# Patient Record
Sex: MALE | Race: WHITE | NOT HISPANIC OR LATINO | Employment: FULL TIME | URBAN - METROPOLITAN AREA
[De-identification: names, ages, dates, MRNs, and addresses within clinical notes are randomized per-mention and may not be internally consistent; named-entity substitution may affect disease eponyms.]

---

## 2018-04-05 RX ORDER — METFORMIN HYDROCHLORIDE 500 MG/1
500 TABLET ORAL 2 TIMES DAILY WITH MEALS
Qty: 180 TABLET | Refills: 3 | Status: SHIPPED | OUTPATIENT
Start: 2018-04-05 | End: 2018-08-08

## 2018-04-05 RX ORDER — TESTOSTERONE CYPIONATE 200 MG/ML
200 INJECTION, SOLUTION INTRAMUSCULAR WEEKLY
Qty: 2 ML | Refills: 0 | Status: SHIPPED | OUTPATIENT
Start: 2018-04-05 | End: 2018-05-02 | Stop reason: SDUPTHER

## 2018-04-05 RX ORDER — METFORMIN HYDROCHLORIDE 500 MG/1
TABLET ORAL
Refills: 0 | COMMUNITY
Start: 2018-01-07 | End: 2018-08-08 | Stop reason: SDUPTHER

## 2018-04-05 NOTE — TELEPHONE ENCOUNTER
----- Message from Leah Deng sent at 4/5/2018 10:19 AM CDT -----  Pt states he is out of metformin and testosterone / asking for refills till he is able to see the   / patsy in [place] / call pt at [phone] to advise

## 2018-05-02 ENCOUNTER — OFFICE VISIT (OUTPATIENT)
Dept: FAMILY MEDICINE | Facility: CLINIC | Age: 33
End: 2018-05-02
Payer: COMMERCIAL

## 2018-05-02 VITALS
SYSTOLIC BLOOD PRESSURE: 141 MMHG | HEART RATE: 88 BPM | HEIGHT: 69 IN | BODY MASS INDEX: 37.18 KG/M2 | DIASTOLIC BLOOD PRESSURE: 78 MMHG | OXYGEN SATURATION: 96 % | RESPIRATION RATE: 20 BRPM | WEIGHT: 251 LBS | TEMPERATURE: 98 F

## 2018-05-02 DIAGNOSIS — E11.9 TYPE 2 DIABETES MELLITUS WITHOUT COMPLICATION, UNSPECIFIED WHETHER LONG TERM INSULIN USE: ICD-10-CM

## 2018-05-02 DIAGNOSIS — E29.1 DEFICIENCY OF TESTOSTERONE BIOSYNTHESIS: Primary | ICD-10-CM

## 2018-05-02 PROCEDURE — 99213 OFFICE O/P EST LOW 20 MIN: CPT | Mod: S$GLB,,, | Performed by: FAMILY MEDICINE

## 2018-05-02 PROCEDURE — 99999 PR PBB SHADOW E&M-EST. PATIENT-LVL III: CPT | Mod: PBBFAC,,, | Performed by: FAMILY MEDICINE

## 2018-05-02 RX ORDER — TESTOSTERONE CYPIONATE 200 MG/ML
200 INJECTION, SOLUTION INTRAMUSCULAR WEEKLY
Qty: 10 ML | Refills: 0 | Status: SHIPPED | OUTPATIENT
Start: 2018-05-02 | End: 2018-07-06 | Stop reason: SDUPTHER

## 2018-05-02 NOTE — PROGRESS NOTES
"Subjective:       Patient ID: Maye Islas is a 32 y.o. male.    Chief Complaint: Medication Refill    HPI   Mr. Islas presents for follow-up. He is due for bloodwork and medication refills. Has a history of type 2 diabetes and testosterone deficiency. Admits he has been out of testosterone for "a while." Reports he has noticed fatigue and depressed mood while off the testosterone.    Review of Systems   Constitutional: Negative for activity change, appetite change, chills and fever.   Respiratory: Negative for cough, choking, shortness of breath and wheezing.    Cardiovascular: Negative for chest pain, palpitations and leg swelling.   Skin: Negative for color change, pallor, rash and wound.       Past Medical History:   Diagnosis Date    Diabetes mellitus, type 2      Past Surgical History:   Procedure Laterality Date    TONSILLECTOMY       Social History     Social History    Marital status:      Spouse name: N/A    Number of children: N/A    Years of education: N/A     Occupational History     Becka     Social History Main Topics    Smoking status: Not on file    Smokeless tobacco: Current User     Types: Snuff    Alcohol use No    Drug use: No    Sexual activity: Yes     Partners: Female     Other Topics Concern    Not on file     Social History Narrative    No narrative on file     Family History   Problem Relation Age of Onset    Diabetes Mother     Diabetes Father     Diabetes Paternal Grandmother     Diabetes Paternal Grandfather        Objective:      BP (!) 141/78 (BP Location: Right arm)   Pulse 88   Temp 98 °F (36.7 °C)   Resp 20   Ht 5' 9" (1.753 m)   Wt 113.9 kg (251 lb)   SpO2 96%   BMI 37.07 kg/m²   Physical Exam   Constitutional: He is oriented to person, place, and time. He appears well-developed and well-nourished. No distress.   Eyes: Conjunctivae and EOM are normal. Pupils are equal, round, and reactive to light. No scleral icterus. " "  Cardiovascular: Normal rate, regular rhythm and normal heart sounds.    No murmur heard.  Neurological: He is alert and oriented to person, place, and time.   Skin: Skin is warm and dry. No rash noted. He is not diaphoretic.   Vitals reviewed.      Assessment:       1. Deficiency of testosterone biosynthesis    2. Type 2 diabetes mellitus without complication, unspecified whether long term insulin use        Plan:       Deficiency of testosterone biosynthesis  -     Patient to return in AM for fasting labs; see refill below; follow up in 4 months  -     Testosterone, Total, Males; Future; Expected date: 05/03/2018  -     PSA, Screening; Future; Expected date: 05/02/2018  -     syringe with needle (SYRINGE 3CC/21GX1") 3 mL 21 gauge x 1" Syrg; 1 Syringe by Misc.(Non-Drug; Combo Route) route once a week.  Dispense: 100 Syringe; Refill: 0  -     testosterone cypionate (DEPOTESTOTERONE CYPIONATE) 200 mg/mL injection; Inject 1 mL (200 mg total) into the muscle once a week.  Dispense: 10 mL; Refill: 0    Type 2 diabetes mellitus without complication, unspecified whether long term insulin use  -     Returning in AM for fasting labs; continue metformin bid  -     Comprehensive metabolic panel; Future; Expected date: 05/02/2018  -     Lipid panel; Future; Expected date: 05/02/2018  -     Hemoglobin A1c; Future; Expected date: 05/02/2018  -     CBC auto differential; Future; Expected date: 05/02/2018            Risks, benefits, and side effects were discussed with the patient. All questions were answered to the fullest satisfaction of the patient, and pt verbalized understanding and agreement to treatment plan. Pt was to call with any new or worsening symptoms, or present to the ER.    "

## 2018-05-03 ENCOUNTER — LAB VISIT (OUTPATIENT)
Dept: LAB | Facility: HOSPITAL | Age: 33
End: 2018-05-03
Attending: FAMILY MEDICINE
Payer: COMMERCIAL

## 2018-05-03 DIAGNOSIS — E11.9 TYPE 2 DIABETES MELLITUS WITHOUT COMPLICATION, UNSPECIFIED WHETHER LONG TERM INSULIN USE: ICD-10-CM

## 2018-05-03 DIAGNOSIS — E29.1 DEFICIENCY OF TESTOSTERONE BIOSYNTHESIS: ICD-10-CM

## 2018-05-03 LAB
ALBUMIN SERPL BCP-MCNC: 4.1 G/DL
ALP SERPL-CCNC: 48 U/L
ALT SERPL W/O P-5'-P-CCNC: 30 U/L
ANION GAP SERPL CALC-SCNC: 6 MMOL/L
AST SERPL-CCNC: 24 U/L
BASOPHILS # BLD AUTO: 0.09 K/UL
BASOPHILS NFR BLD: 1.3 %
BILIRUB SERPL-MCNC: 0.5 MG/DL
BUN SERPL-MCNC: 19 MG/DL
CALCIUM SERPL-MCNC: 9.2 MG/DL
CHLORIDE SERPL-SCNC: 106 MMOL/L
CHOLEST SERPL-MCNC: 158 MG/DL
CHOLEST/HDLC SERPL: 3.4 {RATIO}
CO2 SERPL-SCNC: 24 MMOL/L
COMPLEXED PSA SERPL-MCNC: 0.32 NG/ML
CREAT SERPL-MCNC: 0.7 MG/DL
DIFFERENTIAL METHOD: ABNORMAL
EOSINOPHIL # BLD AUTO: 0.2 K/UL
EOSINOPHIL NFR BLD: 3.2 %
ERYTHROCYTE [DISTWIDTH] IN BLOOD BY AUTOMATED COUNT: 11.9 %
EST. GFR  (AFRICAN AMERICAN): >60 ML/MIN/1.73 M^2
EST. GFR  (NON AFRICAN AMERICAN): >60 ML/MIN/1.73 M^2
ESTIMATED AVG GLUCOSE: 105 MG/DL
GLUCOSE SERPL-MCNC: 122 MG/DL
HBA1C MFR BLD HPLC: 5.3 %
HCT VFR BLD AUTO: 39.8 %
HDLC SERPL-MCNC: 46 MG/DL
HDLC SERPL: 29.1 %
HGB BLD-MCNC: 13.1 G/DL
IMM GRANULOCYTES # BLD AUTO: 0.01 K/UL
IMM GRANULOCYTES NFR BLD AUTO: 0.1 %
LDLC SERPL CALC-MCNC: 100.8 MG/DL
LYMPHOCYTES # BLD AUTO: 2.2 K/UL
LYMPHOCYTES NFR BLD: 32 %
MCH RBC QN AUTO: 27.2 PG
MCHC RBC AUTO-ENTMCNC: 32.9 G/DL
MCV RBC AUTO: 83 FL
MONOCYTES # BLD AUTO: 0.5 K/UL
MONOCYTES NFR BLD: 6.9 %
NEUTROPHILS # BLD AUTO: 3.8 K/UL
NEUTROPHILS NFR BLD: 56.5 %
NONHDLC SERPL-MCNC: 112 MG/DL
NRBC BLD-RTO: 0 /100 WBC
PLATELET # BLD AUTO: 258 K/UL
PMV BLD AUTO: 11.2 FL
POTASSIUM SERPL-SCNC: 4.1 MMOL/L
PROT SERPL-MCNC: 7.5 G/DL
RBC # BLD AUTO: 4.81 M/UL
SODIUM SERPL-SCNC: 136 MMOL/L
TRIGL SERPL-MCNC: 56 MG/DL
WBC # BLD AUTO: 6.78 K/UL

## 2018-05-03 PROCEDURE — 83036 HEMOGLOBIN GLYCOSYLATED A1C: CPT

## 2018-05-03 PROCEDURE — 84153 ASSAY OF PSA TOTAL: CPT

## 2018-05-03 PROCEDURE — 85025 COMPLETE CBC W/AUTO DIFF WBC: CPT

## 2018-05-03 PROCEDURE — 80053 COMPREHEN METABOLIC PANEL: CPT

## 2018-05-03 PROCEDURE — 80061 LIPID PANEL: CPT

## 2018-05-03 PROCEDURE — 36415 COLL VENOUS BLD VENIPUNCTURE: CPT

## 2018-05-09 DIAGNOSIS — D64.9 ANEMIA, UNSPECIFIED TYPE: Primary | ICD-10-CM

## 2018-07-06 ENCOUNTER — TELEPHONE (OUTPATIENT)
Dept: FAMILY MEDICINE | Facility: CLINIC | Age: 33
End: 2018-07-06

## 2018-07-06 DIAGNOSIS — E29.1 DEFICIENCY OF TESTOSTERONE BIOSYNTHESIS: ICD-10-CM

## 2018-07-06 RX ORDER — TESTOSTERONE CYPIONATE 200 MG/ML
200 INJECTION, SOLUTION INTRAMUSCULAR WEEKLY
Qty: 10 ML | Refills: 0 | Status: SHIPPED | OUTPATIENT
Start: 2018-07-06 | End: 2018-08-08 | Stop reason: SDUPTHER

## 2018-07-06 NOTE — TELEPHONE ENCOUNTER
----- Message from Annamaria Castellano sent at 7/6/2018  8:49 AM CDT -----  Contact: self  Type:  Patient Returning Call    Who Called:  patient  Who Left Message for Patient:  nurse  Does the patient know what this is regarding?:  no  Best Call Back Number:  [phone] (home)     Additional Information:

## 2018-07-06 NOTE — TELEPHONE ENCOUNTER
----- Message from Erica Angelbenita sent at 7/5/2018  4:15 PM CDT -----  Contact: Self  Type:  RX Refill Request    Who Called:  patient  Refill or New Rx:  refill  RX Name and Strength: testosterone cypionate (DEPOTESTOTERONE CYPIONATE) 200 mg/mL injection  How is the patient currently taking it? (ex. 1XDay):  na  Is this a 30 day or 90 day RX:  30 day  Preferred Pharmacy with phone number:    [facility] [address] [place] [address] AT [address]  [address] [address]  Phone: [phone] Fax: [phone]  Local or Mail Order:  local  Ordering Provider:  Dr Marcell Urbano Call Back Number:  [phone] (home)   Additional Information:  He has an appt coming up but needs script before then

## 2018-08-08 ENCOUNTER — LAB VISIT (OUTPATIENT)
Dept: LAB | Facility: HOSPITAL | Age: 33
End: 2018-08-08
Attending: FAMILY MEDICINE
Payer: COMMERCIAL

## 2018-08-08 ENCOUNTER — OFFICE VISIT (OUTPATIENT)
Dept: FAMILY MEDICINE | Facility: CLINIC | Age: 33
End: 2018-08-08
Payer: COMMERCIAL

## 2018-08-08 VITALS
OXYGEN SATURATION: 96 % | RESPIRATION RATE: 20 BRPM | HEIGHT: 70 IN | DIASTOLIC BLOOD PRESSURE: 84 MMHG | WEIGHT: 270 LBS | BODY MASS INDEX: 38.65 KG/M2 | SYSTOLIC BLOOD PRESSURE: 151 MMHG | HEART RATE: 65 BPM

## 2018-08-08 DIAGNOSIS — E29.1 DEFICIENCY OF TESTOSTERONE BIOSYNTHESIS: ICD-10-CM

## 2018-08-08 DIAGNOSIS — D64.9 ANEMIA, UNSPECIFIED TYPE: ICD-10-CM

## 2018-08-08 LAB
BASOPHILS # BLD AUTO: 0.13 K/UL
BASOPHILS NFR BLD: 1.6 %
DIFFERENTIAL METHOD: NORMAL
EOSINOPHIL # BLD AUTO: 0.2 K/UL
EOSINOPHIL NFR BLD: 2.7 %
ERYTHROCYTE [DISTWIDTH] IN BLOOD BY AUTOMATED COUNT: 11.8 %
FOLATE SERPL-MCNC: 6 NG/ML
HCT VFR BLD AUTO: 42.9 %
HGB BLD-MCNC: 14 G/DL
IMM GRANULOCYTES # BLD AUTO: 0.02 K/UL
IMM GRANULOCYTES NFR BLD AUTO: 0.2 %
IRON SERPL-MCNC: 48 UG/DL
LYMPHOCYTES # BLD AUTO: 2.3 K/UL
LYMPHOCYTES NFR BLD: 28.3 %
MCH RBC QN AUTO: 27.2 PG
MCHC RBC AUTO-ENTMCNC: 32.6 G/DL
MCV RBC AUTO: 84 FL
MONOCYTES # BLD AUTO: 0.6 K/UL
MONOCYTES NFR BLD: 7.7 %
NEUTROPHILS # BLD AUTO: 4.9 K/UL
NEUTROPHILS NFR BLD: 59.5 %
NRBC BLD-RTO: 0 /100 WBC
PLATELET # BLD AUTO: 252 K/UL
PMV BLD AUTO: 10.9 FL
RBC # BLD AUTO: 5.14 M/UL
SATURATED IRON: 15 %
TOTAL IRON BINDING CAPACITY: 330 UG/DL
TRANSFERRIN SERPL-MCNC: 223 MG/DL
VIT B12 SERPL-MCNC: 676 PG/ML
WBC # BLD AUTO: 8.16 K/UL

## 2018-08-08 PROCEDURE — 85025 COMPLETE CBC W/AUTO DIFF WBC: CPT

## 2018-08-08 PROCEDURE — 99213 OFFICE O/P EST LOW 20 MIN: CPT | Mod: S$GLB,ICN,, | Performed by: FAMILY MEDICINE

## 2018-08-08 PROCEDURE — 82607 VITAMIN B-12: CPT

## 2018-08-08 PROCEDURE — 83540 ASSAY OF IRON: CPT

## 2018-08-08 PROCEDURE — 99999 PR PBB SHADOW E&M-EST. PATIENT-LVL III: CPT | Mod: PBBFAC,,, | Performed by: FAMILY MEDICINE

## 2018-08-08 PROCEDURE — 36415 COLL VENOUS BLD VENIPUNCTURE: CPT

## 2018-08-08 PROCEDURE — 82746 ASSAY OF FOLIC ACID SERUM: CPT

## 2018-08-08 PROCEDURE — 3008F BODY MASS INDEX DOCD: CPT | Mod: S$GLB,ICN,, | Performed by: FAMILY MEDICINE

## 2018-08-08 RX ORDER — TESTOSTERONE CYPIONATE 200 MG/ML
200 INJECTION, SOLUTION INTRAMUSCULAR WEEKLY
Qty: 10 ML | Refills: 0 | Status: SHIPPED | OUTPATIENT
Start: 2018-08-08 | End: 2018-10-24 | Stop reason: SDUPTHER

## 2018-08-08 NOTE — PROGRESS NOTES
"Subjective:       Patient ID: Maye Islas is a 32 y.o. male.    Chief Complaint: Follow-up    Testosterone working well, requests refill today, no issues, compliant with therapy.      Review of Systems   Constitutional: Negative for activity change, appetite change, fatigue and fever.   HENT: Negative for congestion, dental problem, ear discharge, hearing loss, postnasal drip, sinus pain, sneezing and trouble swallowing.    Eyes: Negative for photophobia and discharge.   Respiratory: Negative for apnea, cough and shortness of breath.    Cardiovascular: Negative for chest pain.   Gastrointestinal: Negative for abdominal distention, abdominal pain, blood in stool, diarrhea, nausea and vomiting.   Endocrine: Negative for cold intolerance.   Genitourinary: Negative for difficulty urinating, flank pain, frequency, hematuria and testicular pain.   Musculoskeletal: Negative for arthralgias and myalgias.   Skin: Negative for color change.   Allergic/Immunologic: Negative for environmental allergies, food allergies and immunocompromised state.   Neurological: Negative for dizziness, syncope, numbness and headaches.   Hematological: Negative for adenopathy. Does not bruise/bleed easily.   Psychiatric/Behavioral: Negative for agitation, confusion, decreased concentration, hallucinations, self-injury and sleep disturbance.   All other systems reviewed and are negative.        Reviewed family, medical, surgical, and social history.    Objective:      BP (!) 151/84 (BP Location: Left arm, Patient Position: Sitting, BP Method: Large (Automatic))   Pulse 65   Resp 20   Ht 5' 10" (1.778 m)   Wt 122.5 kg (270 lb)   SpO2 96%   BMI 38.74 kg/m²   Physical Exam   Constitutional: He is oriented to person, place, and time. He appears well-developed and well-nourished. No distress.   HENT:   Head: Normocephalic and atraumatic.   Nose: Nose normal.   Mouth/Throat: Oropharynx is clear and moist. No oropharyngeal exudate.   Eyes: " Conjunctivae and EOM are normal. Pupils are equal, round, and reactive to light.   Neck: Normal range of motion. No thyromegaly present.   Cardiovascular: Normal rate, regular rhythm, normal heart sounds and intact distal pulses.  Exam reveals no gallop and no friction rub.    No murmur heard.  Pulmonary/Chest: Effort normal and breath sounds normal. No respiratory distress. He has no wheezes. He has no rales.   Abdominal: Soft. He exhibits no distension. There is no tenderness. There is no guarding.   Musculoskeletal: Normal range of motion. He exhibits no edema, tenderness or deformity.   Neurological: He is alert and oriented to person, place, and time. He displays normal reflexes. No cranial nerve deficit or sensory deficit. He exhibits normal muscle tone. Coordination normal.   Skin: Skin is warm and dry. No rash noted. He is not diaphoretic. No erythema. No pallor.   Psychiatric: He has a normal mood and affect. His behavior is normal. Judgment and thought content normal.   Nursing note and vitals reviewed.      Assessment:       1. Deficiency of testosterone biosynthesis        Plan:       Deficiency of testosterone biosynthesis  -     testosterone cypionate (DEPOTESTOTERONE CYPIONATE) 200 mg/mL injection; Inject 1 mL (200 mg total) into the muscle once a week.  Dispense: 10 mL; Refill: 0  -     CBC auto differential; Future; Expected date: 08/08/2018            Risks, benefits, and side effects were discussed with the patient. All questions were answered to the fullest satisfaction of the patient, and pt verbalized understanding and agreement to treatment plan. Pt was to call with any new or worsening symptoms, or present to the ER.

## 2018-08-14 ENCOUNTER — TELEPHONE (OUTPATIENT)
Dept: FAMILY MEDICINE | Facility: CLINIC | Age: 33
End: 2018-08-14

## 2018-08-14 NOTE — TELEPHONE ENCOUNTER
Spoke with pt.  He stated that he forgot to do the FOBT and it was explained to him that Dr Booth would like for him to submit this. Pt verbalized understanding.  Geraldine

## 2018-08-14 NOTE — TELEPHONE ENCOUNTER
----- Message from Denny Booth MD sent at 8/13/2018  5:03 PM CDT -----  Would you see if he is going to provide the FOBT. If not, that's fine, and I will release his other test results.

## 2018-10-24 ENCOUNTER — TELEPHONE (OUTPATIENT)
Dept: FAMILY MEDICINE | Facility: CLINIC | Age: 33
End: 2018-10-24

## 2018-10-24 DIAGNOSIS — E29.1 DEFICIENCY OF TESTOSTERONE BIOSYNTHESIS: ICD-10-CM

## 2018-10-24 RX ORDER — TESTOSTERONE CYPIONATE 200 MG/ML
200 INJECTION, SOLUTION INTRAMUSCULAR WEEKLY
Qty: 10 ML | Refills: 0 | Status: SHIPPED | OUTPATIENT
Start: 2018-10-24 | End: 2018-12-10 | Stop reason: SDUPTHER

## 2018-10-24 NOTE — TELEPHONE ENCOUNTER
----- Message from Megan Castle sent at 10/24/2018  3:54 PM CDT -----  Contact: patient  Type:  Patient Returning Call    Who Called:  patient  Who Left Message for Patient:  vadim  Does the patient know what this is regarding?:  yes  Best Call Back Number:  [phone]  Additional Information:  Requesting a call back

## 2018-10-24 NOTE — TELEPHONE ENCOUNTER
----- Message from Nilo Hansen sent at 10/24/2018  3:34 PM CDT -----  Type: Needs Medical Advice    Who Called:  Patient  Best Call Back Number: [phone] (home)   Additional Information: Patient wants to talk to someone about Testosterone

## 2018-12-10 ENCOUNTER — OFFICE VISIT (OUTPATIENT)
Dept: FAMILY MEDICINE | Facility: CLINIC | Age: 33
End: 2018-12-10
Payer: COMMERCIAL

## 2018-12-10 VITALS
HEIGHT: 69 IN | WEIGHT: 262.5 LBS | HEART RATE: 74 BPM | SYSTOLIC BLOOD PRESSURE: 143 MMHG | RESPIRATION RATE: 20 BRPM | DIASTOLIC BLOOD PRESSURE: 89 MMHG | BODY MASS INDEX: 38.88 KG/M2 | OXYGEN SATURATION: 97 %

## 2018-12-10 DIAGNOSIS — E29.1 DEFICIENCY OF TESTOSTERONE BIOSYNTHESIS: ICD-10-CM

## 2018-12-10 DIAGNOSIS — R73.9 HYPERGLYCEMIA: Primary | ICD-10-CM

## 2018-12-10 DIAGNOSIS — E11.9 TYPE 2 DIABETES MELLITUS WITHOUT COMPLICATION, WITHOUT LONG-TERM CURRENT USE OF INSULIN: ICD-10-CM

## 2018-12-10 PROCEDURE — 3008F BODY MASS INDEX DOCD: CPT | Mod: S$GLB,,, | Performed by: FAMILY MEDICINE

## 2018-12-10 PROCEDURE — 3044F HG A1C LEVEL LT 7.0%: CPT | Mod: S$GLB,,, | Performed by: FAMILY MEDICINE

## 2018-12-10 PROCEDURE — 99214 OFFICE O/P EST MOD 30 MIN: CPT | Mod: S$GLB,,, | Performed by: FAMILY MEDICINE

## 2018-12-10 PROCEDURE — 99999 PR PBB SHADOW E&M-EST. PATIENT-LVL III: CPT | Mod: PBBFAC,,, | Performed by: FAMILY MEDICINE

## 2018-12-10 RX ORDER — TESTOSTERONE CYPIONATE 200 MG/ML
200 INJECTION, SOLUTION INTRAMUSCULAR WEEKLY
Qty: 10 ML | Refills: 0 | Status: SHIPPED | OUTPATIENT
Start: 2018-12-10 | End: 2019-01-14 | Stop reason: SDUPTHER

## 2018-12-10 NOTE — PROGRESS NOTES
"Subjective:       Patient ID: Maye Islas is a 33 y.o. male.    Chief Complaint: Follow-up and Medication Refill    Follow up    Testosterone replacement going well  No issues  Compliant with therapy      Review of Systems   Constitutional: Negative for activity change, appetite change, fatigue and fever.   HENT: Negative for congestion, dental problem, ear discharge, hearing loss, postnasal drip, sinus pain, sneezing and trouble swallowing.    Eyes: Negative for photophobia and discharge.   Respiratory: Negative for apnea, cough and shortness of breath.    Cardiovascular: Negative for chest pain.   Gastrointestinal: Negative for abdominal distention, abdominal pain, blood in stool, diarrhea, nausea and vomiting.   Endocrine: Negative for cold intolerance.   Genitourinary: Negative for difficulty urinating, flank pain, frequency, hematuria and testicular pain.   Musculoskeletal: Negative for arthralgias and myalgias.   Skin: Negative for color change.   Allergic/Immunologic: Negative for environmental allergies, food allergies and immunocompromised state.   Neurological: Negative for dizziness, syncope, numbness and headaches.   Hematological: Negative for adenopathy. Does not bruise/bleed easily.   Psychiatric/Behavioral: Negative for agitation, confusion, decreased concentration, hallucinations, self-injury and sleep disturbance.   All other systems reviewed and are negative.        Reviewed family, medical, surgical, and social history.    Objective:      BP (!) 143/89 (BP Location: Left arm, Patient Position: Sitting, BP Method: Large (Automatic))   Pulse 74   Resp 20   Ht 5' 9" (1.753 m)   Wt 119.1 kg (262 lb 8 oz)   SpO2 97%   BMI 38.76 kg/m²   Physical Exam   Constitutional: He is oriented to person, place, and time. He appears well-developed and well-nourished. No distress.   HENT:   Head: Normocephalic and atraumatic.   Nose: Nose normal.   Mouth/Throat: Oropharynx is clear and moist. No " oropharyngeal exudate.   Eyes: Conjunctivae and EOM are normal. Pupils are equal, round, and reactive to light.   Neck: Normal range of motion. No thyromegaly present.   Cardiovascular: Normal rate, regular rhythm, normal heart sounds and intact distal pulses. Exam reveals no gallop and no friction rub.   No murmur heard.  Pulmonary/Chest: Effort normal and breath sounds normal. No respiratory distress. He has no wheezes. He has no rales.   Abdominal: Soft. He exhibits no distension. There is no tenderness. There is no guarding.   Musculoskeletal: Normal range of motion. He exhibits no edema, tenderness or deformity.   Neurological: He is alert and oriented to person, place, and time. He displays normal reflexes. No cranial nerve deficit or sensory deficit. He exhibits normal muscle tone. Coordination normal.   Skin: Skin is warm and dry. No rash noted. He is not diaphoretic. No erythema. No pallor.   Psychiatric: He has a normal mood and affect. His behavior is normal. Judgment and thought content normal.   Nursing note and vitals reviewed.      Assessment:       1. Hyperglycemia    2. Deficiency of testosterone biosynthesis    3. Type 2 diabetes mellitus without complication, without long-term current use of insulin        Plan:       Hyperglycemia  -     testosterone cypionate (DEPOTESTOTERONE CYPIONATE) 200 mg/mL injection; Inject 1 mL (200 mg total) into the muscle once a week.  Dispense: 10 mL; Refill: 0  -     CBC auto differential; Future; Expected date: 12/10/2018  -     Comprehensive metabolic panel; Future; Expected date: 12/10/2018  -     Hemoglobin A1c; Future; Expected date: 12/10/2018  -     Lipid panel; Future; Expected date: 12/10/2018  -     TSH; Future; Expected date: 12/10/2018  -     Testosterone; Future; Expected date: 12/10/2018  -     T4, free; Future; Expected date: 12/10/2018    Deficiency of testosterone biosynthesis  -     testosterone cypionate (DEPOTESTOTERONE CYPIONATE) 200 mg/mL  "injection; Inject 1 mL (200 mg total) into the muscle once a week.  Dispense: 10 mL; Refill: 0  -     CBC auto differential; Future; Expected date: 12/10/2018  -     Comprehensive metabolic panel; Future; Expected date: 12/10/2018  -     Hemoglobin A1c; Future; Expected date: 12/10/2018  -     Lipid panel; Future; Expected date: 12/10/2018  -     TSH; Future; Expected date: 12/10/2018  -     Testosterone; Future; Expected date: 12/10/2018  -     T4, free; Future; Expected date: 12/10/2018  -     PSA, Screening; Future; Expected date: 12/10/2018    Type 2 diabetes mellitus without complication, without long-term current use of insulin    Other orders  -     Cancel: syringe with needle (SYRINGE 3CC/21GX1") 3 mL 21 gauge x 1" Syrg; 1 Syringe by Misc.(Non-Drug; Combo Route) route once a week.  Dispense: 100 Syringe; Refill: 0            Risks, benefits, and side effects were discussed with the patient. All questions were answered to the fullest satisfaction of the patient, and pt verbalized understanding and agreement to treatment plan. Pt was to call with any new or worsening symptoms, or present to the ER.  "

## 2019-01-14 DIAGNOSIS — E29.1 DEFICIENCY OF TESTOSTERONE BIOSYNTHESIS: ICD-10-CM

## 2019-01-14 DIAGNOSIS — R73.9 HYPERGLYCEMIA: ICD-10-CM

## 2019-01-14 RX ORDER — TESTOSTERONE CYPIONATE 200 MG/ML
200 INJECTION, SOLUTION INTRAMUSCULAR WEEKLY
Qty: 10 ML | Refills: 0 | Status: SHIPPED | OUTPATIENT
Start: 2019-01-14 | End: 2019-02-18 | Stop reason: SDUPTHER

## 2019-02-18 DIAGNOSIS — R73.9 HYPERGLYCEMIA: ICD-10-CM

## 2019-02-18 DIAGNOSIS — E29.1 DEFICIENCY OF TESTOSTERONE BIOSYNTHESIS: ICD-10-CM

## 2019-02-18 RX ORDER — TESTOSTERONE CYPIONATE 200 MG/ML
200 INJECTION, SOLUTION INTRAMUSCULAR WEEKLY
Qty: 10 ML | Refills: 0 | Status: SHIPPED | OUTPATIENT
Start: 2019-02-18 | End: 2019-10-27

## 2019-02-18 NOTE — TELEPHONE ENCOUNTER
----- Message from Megan Castle sent at 2/18/2019 10:08 AM CST -----  Contact: patient  Type:  RX Refill Request    Who Called:  patient  Refill or New Rx:  refill  RX Name and Strength:  testosterone cypionate (DEPOTESTOTERONE CYPIONATE) 200 mg/mL injection  How is the patient currently taking it? (ex. 1XDay):    Is this a 30 day or 90 day RX:    Preferred Pharmacy with phone number:  [facility] (pateint is no longer with [facility] pharmacy)  Local or Mail Order:  Local  Ordering Provider:  Dr.Adams Urbano Call Back Number:  [phone]  Additional Information:  Requesting a call back when script has been sent

## 2019-05-03 DIAGNOSIS — E11.9 TYPE 2 DIABETES MELLITUS WITHOUT COMPLICATION: ICD-10-CM

## 2019-08-02 ENCOUNTER — PATIENT OUTREACH (OUTPATIENT)
Dept: ADMINISTRATIVE | Facility: HOSPITAL | Age: 34
End: 2019-08-02

## 2019-08-02 ENCOUNTER — TELEPHONE (OUTPATIENT)
Dept: ADMINISTRATIVE | Facility: HOSPITAL | Age: 34
End: 2019-08-02

## 2019-08-02 DIAGNOSIS — E11.9 TYPE 2 DIABETES MELLITUS WITHOUT COMPLICATION, WITHOUT LONG-TERM CURRENT USE OF INSULIN: Primary | ICD-10-CM

## 2019-08-02 NOTE — LETTER
August 2, 2019    Maye Islas  [address] [place] [address]             [facility]  [address]  [facility] [address]  Phone: [phone] Dear Je[facility] is committed to your overall health and would like to ensure that you are up to date on your recommended test and/or procedures.   Denny Booth MD  has found that your chart shows you may be due for the following:    YEARLY DIABETIC EYE EXAM & FOOT EXAM  DIABETIC LABS    If you have had any of the above done at another facility, please let us know so that we may obtain copies from that facility.  If you have a copy of these records, please provide a copy for us to scan into your chart.  You are welcome to request that the report be faxed to us at  ([phone]).     Otherwise, please schedule these appointments at your earliest convenience by calling [phone] or going to [email or web address].    If you have an upcoming scheduled appointment for the item above, please disregard this letter.    Sincerely,  Your [facility]  MD Bernarda Saunders L.P.N. Clinical Care Coordinator  [address] [place] [address] [phone]

## 2019-10-27 ENCOUNTER — HOSPITAL ENCOUNTER (EMERGENCY)
Facility: HOSPITAL | Age: 34
Discharge: HOME | End: 2019-10-27
Attending: EMERGENCY MEDICINE
Payer: COMMERCIAL

## 2019-10-27 VITALS
BODY MASS INDEX: 36.73 KG/M2 | SYSTOLIC BLOOD PRESSURE: 157 MMHG | OXYGEN SATURATION: 99 % | HEART RATE: 94 BPM | HEIGHT: 69 IN | RESPIRATION RATE: 20 BRPM | DIASTOLIC BLOOD PRESSURE: 95 MMHG | WEIGHT: 248 LBS | TEMPERATURE: 99 F

## 2019-10-27 DIAGNOSIS — S61.212A LACERATION OF RIGHT MIDDLE FINGER WITHOUT FOREIGN BODY, NAIL DAMAGE STATUS UNSPECIFIED, INITIAL ENCOUNTER: Primary | ICD-10-CM

## 2019-10-27 PROCEDURE — 73130 X-RAY EXAM OF HAND: CPT | Mod: TC,FY,RT

## 2019-10-27 PROCEDURE — 73130 X-RAY EXAM OF HAND: CPT | Mod: 26,RT,, | Performed by: RADIOLOGY

## 2019-10-27 PROCEDURE — 73130 XR HAND COMPLETE 3 VIEW RIGHT: ICD-10-PCS | Mod: 26,RT,, | Performed by: RADIOLOGY

## 2019-10-27 PROCEDURE — 63600175 PHARM REV CODE 636 W HCPCS

## 2019-10-27 PROCEDURE — 25000003 PHARM REV CODE 250

## 2019-10-27 PROCEDURE — 63600175 PHARM REV CODE 636 W HCPCS: Performed by: INTERNAL MEDICINE

## 2019-10-27 PROCEDURE — 96372 THER/PROPH/DIAG INJ SC/IM: CPT | Mod: 59

## 2019-10-27 PROCEDURE — 99284 EMERGENCY DEPT VISIT MOD MDM: CPT | Mod: 25

## 2019-10-27 PROCEDURE — 90714 TD VACC NO PRESV 7 YRS+ IM: CPT

## 2019-10-27 PROCEDURE — 90471 IMMUNIZATION ADMIN: CPT

## 2019-10-27 PROCEDURE — 12002 RPR S/N/AX/GEN/TRNK2.6-7.5CM: CPT

## 2019-10-27 PROCEDURE — 25000003 PHARM REV CODE 250: Performed by: INTERNAL MEDICINE

## 2019-10-27 RX ORDER — CEFTRIAXONE 1 G/1
1 INJECTION, POWDER, FOR SOLUTION INTRAMUSCULAR; INTRAVENOUS
Status: COMPLETED | OUTPATIENT
Start: 2019-10-27 | End: 2019-10-27

## 2019-10-27 RX ORDER — LIDOCAINE HYDROCHLORIDE 10 MG/ML
INJECTION INFILTRATION; PERINEURAL
Status: COMPLETED
Start: 2019-10-27 | End: 2019-10-27

## 2019-10-27 RX ORDER — LIDOCAINE HYDROCHLORIDE 10 MG/ML
5 INJECTION, SOLUTION EPIDURAL; INFILTRATION; INTRACAUDAL; PERINEURAL
Status: DISCONTINUED | OUTPATIENT
Start: 2019-10-27 | End: 2019-10-27

## 2019-10-27 RX ORDER — DOXYCYCLINE 100 MG/1
100 CAPSULE ORAL 2 TIMES DAILY
Qty: 20 CAPSULE | Refills: 0 | Status: SHIPPED | OUTPATIENT
Start: 2019-10-27 | End: 2019-11-06

## 2019-10-27 RX ORDER — HYDROCODONE BITARTRATE AND ACETAMINOPHEN 10; 325 MG/1; MG/1
1 TABLET ORAL EVERY 4 HOURS PRN
Qty: 8 TABLET | Refills: 0 | Status: SHIPPED | OUTPATIENT
Start: 2019-10-27 | End: 2020-08-10

## 2019-10-27 RX ORDER — HYDROCODONE BITARTRATE AND ACETAMINOPHEN 10; 325 MG/1; MG/1
1 TABLET ORAL
Status: COMPLETED | OUTPATIENT
Start: 2019-10-27 | End: 2019-10-27

## 2019-10-27 RX ORDER — DOXYCYCLINE HYCLATE 100 MG
100 TABLET ORAL
Status: COMPLETED | OUTPATIENT
Start: 2019-10-27 | End: 2019-10-27

## 2019-10-27 RX ADMIN — TETANUS AND DIPHTHERIA TOXOIDS ADSORBED 0.5 ML: 2; 2 INJECTION INTRAMUSCULAR at 05:10

## 2019-10-27 RX ADMIN — CEFTRIAXONE SODIUM 1 G: 1 INJECTION, POWDER, FOR SOLUTION INTRAMUSCULAR; INTRAVENOUS at 06:10

## 2019-10-27 RX ADMIN — DOXYCYCLINE HYCLATE 100 MG: 100 TABLET, COATED ORAL at 06:10

## 2019-10-27 RX ADMIN — LIDOCAINE HYDROCHLORIDE 200 MG: 10 INJECTION, SOLUTION INFILTRATION; PERINEURAL at 05:10

## 2019-10-27 RX ADMIN — HYDROCODONE BITARTRATE AND ACETAMINOPHEN 1 TABLET: 10; 325 TABLET ORAL at 06:10

## 2019-10-27 NOTE — ED PROVIDER NOTES
Encounter Date: 10/27/2019       History     Chief Complaint   Patient presents with    Finger Injury     Patient cut the end of his right middle finger off, smashed in a leaf spring on a truck.     Patient comes in with the very tip of his finger avulsed cleanly with the tip of his finger in a bag.  The bone was not clearly apparent and the wound was clean both the proximal tip of the finger as well as on the avulsed tip.  This happened approximately 30-40 minutes prior to arrival.  He immediately put the tip on ice and came to the ED.  The avulsed piece was full-thickness.  The nail was partially avulsed the 3/4 of the nail was still intact. Was no profuse bleeding.  It was elected to immediately tack the avulsed tip on to the finger this was successfully performed with 3 0 nylon.  The sutures were left relatively lose but good approximation was obtained. X-ray is being obtained.    Patient was explained that this will only protect the finger and that there is 80% chance the tip will not survive.  He needs follow up immediately with a hand surgeon may need to do a flap closure, but that decision will need to be made within the next 24-48 hours.  Is perhaps a 20% chance this tip might take.  It was packed and a petrolatum dressing.        Review of patient's allergies indicates:  No Known Allergies  Past Medical History:   Diagnosis Date    Diabetes mellitus, type 2      Past Surgical History:   Procedure Laterality Date    TONSILLECTOMY       Family History   Problem Relation Age of Onset    Diabetes Mother     Diabetes Father     Diabetes Paternal Grandmother     Diabetes Paternal Grandfather      Social History     Tobacco Use    Smoking status: Former Smoker     Years: 10.00    Smokeless tobacco: Current User     Types: Snuff   Substance Use Topics    Alcohol use: No    Drug use: No     Review of Systems   Constitutional: Negative for fever.   HENT: Negative for sore throat.    Respiratory: Negative  for shortness of breath.    Cardiovascular: Negative for chest pain.   Gastrointestinal: Negative for nausea.   Genitourinary: Negative for dysuria.   Musculoskeletal: Negative for back pain.   Skin: Negative for rash.   Neurological: Negative for weakness.   Hematological: Does not bruise/bleed easily.       Physical Exam     Initial Vitals [10/27/19 1730]   BP Pulse Resp Temp SpO2   (!) 157/95 94 20 98.6 °F (37 °C) 99 %      MAP       --         Physical Exam    Nursing note and vitals reviewed.  Constitutional: Vital signs are normal. He appears well-developed and well-nourished. He is active and cooperative.   HENT:   Head: Normocephalic and atraumatic.   Eyes: Conjunctivae, EOM and lids are normal. Pupils are equal, round, and reactive to light. Lids are everted and swept, no foreign bodies found.   Neck: Trachea normal, normal range of motion and full passive range of motion without pain. Neck supple.   Cardiovascular: Normal rate, regular rhythm, S1 normal, S2 normal, normal heart sounds, intact distal pulses and normal pulses.  No extrasystoles are present.    Pulmonary/Chest: Breath sounds normal.   Abdominal: Soft. Normal appearance and bowel sounds are normal.   Musculoskeletal: Normal range of motion.        Arms:  Neurological: He is alert. He has normal reflexes. GCS eye subscore is 4. GCS verbal subscore is 5. GCS motor subscore is 6.   Skin: Skin is warm, dry and intact. Capillary refill takes less than 2 seconds.   Psychiatric: He has a normal mood and affect. His speech is normal and behavior is normal. Cognition and memory are normal.         ED Course   Lac Repair  Date/Time: 10/27/2019 6:18 PM  Performed by: Tashi Rocha MD  Authorized by: Tashi Rocha MD   Consent Done: Emergent Situation  Body area: upper extremity  Location details: right long finger  Laceration length: 5 cm  Foreign bodies: no foreign bodies  Tendon involvement: none  Nerve involvement: none  Vascular damage:  no  Anesthesia: digital block    Anesthesia:  Local anesthesia used: yes  Local Anesthetic: lidocaine 1% without epinephrine  Anesthetic total: 8 mL  Patient sedated: no  Preparation: Patient was prepped and draped in the usual sterile fashion.  Irrigation method: jet lavage  Amount of cleaning: standard  Debridement: none  Degree of undermining: none  Skin closure: 3-0 nylon  Number of sutures: 8  Technique: simple  Approximation: loose  Approximation difficulty: simple  Comments: Patient had the tip of the avulsed finger 1st attached at the nail beds with pre good approximation.  Then had a anchor suture in the middle of the nail placed and then remainder of the tip was sutured in place.  Fairly good approximation was obtained. No return of color was noted although not expected.        Labs Reviewed - No data to display       Imaging Results          X-Ray Hand 3 view Right (In process)                  Medical Decision Making:   ED Management:  Patient comes in with a tip of his middle right finger avulsed.  He placed it on ice and came directly to the hospital.  Approximately 30 min to onset.  The bone was not evident and therefore was elected to replace the pad immediately.  He had good approximation since the nail bed was not avulsed and landmarks were evident.  X-ray revealed no bony injury. He is to see a hand surgeon tomorrow which will be arranged.                      Clinical Impression:       ICD-10-CM ICD-9-CM   1. Laceration of right middle finger without foreign body, nail damage status unspecified, initial encounter S61.212A 883.0                                Tashi Rocha MD  10/27/19 1823

## 2019-11-18 ENCOUNTER — TELEPHONE (OUTPATIENT)
Dept: ADMINISTRATIVE | Facility: HOSPITAL | Age: 34
End: 2019-11-18

## 2020-01-07 LAB
LEFT EYE DM RETINOPATHY: NEGATIVE
RIGHT EYE DM RETINOPATHY: NEGATIVE

## 2020-02-21 ENCOUNTER — TELEPHONE (OUTPATIENT)
Dept: FAMILY MEDICINE | Facility: CLINIC | Age: 35
End: 2020-02-21

## 2020-02-21 NOTE — TELEPHONE ENCOUNTER
Pt has not been seen by PCP in over a year. I called pt to see if there is anything we can assist him with at this time. No answer.

## 2020-03-24 ENCOUNTER — PATIENT OUTREACH (OUTPATIENT)
Dept: ADMINISTRATIVE | Facility: HOSPITAL | Age: 35
End: 2020-03-24

## 2020-03-24 ENCOUNTER — TELEPHONE (OUTPATIENT)
Dept: ADMINISTRATIVE | Facility: HOSPITAL | Age: 35
End: 2020-03-24

## 2020-03-24 NOTE — PROGRESS NOTES
"Population Health Outreach.  Spoke with pt about overdue labs and needing an annual appt. Pt stated that he was not sick and was not coming in. I attempted to educate him about the importance of labs when he is a diabetic. He stated "I am not a Diabetic" he informed me he will call when he is ready to be seen.  "

## 2020-08-07 ENCOUNTER — HOSPITAL ENCOUNTER (EMERGENCY)
Facility: HOSPITAL | Age: 35
Discharge: HOME | End: 2020-08-07
Attending: EMERGENCY MEDICINE
Payer: COMMERCIAL

## 2020-08-07 VITALS
TEMPERATURE: 98 F | HEIGHT: 70 IN | BODY MASS INDEX: 35.22 KG/M2 | SYSTOLIC BLOOD PRESSURE: 134 MMHG | HEART RATE: 78 BPM | RESPIRATION RATE: 20 BRPM | OXYGEN SATURATION: 99 % | WEIGHT: 246 LBS | DIASTOLIC BLOOD PRESSURE: 93 MMHG

## 2020-08-07 DIAGNOSIS — R00.2 PALPITATIONS: ICD-10-CM

## 2020-08-07 LAB — POCT GLUCOSE: 101 MG/DL (ref 70–110)

## 2020-08-07 PROCEDURE — 93005 ELECTROCARDIOGRAM TRACING: CPT

## 2020-08-07 PROCEDURE — 82962 GLUCOSE BLOOD TEST: CPT

## 2020-08-07 PROCEDURE — 99283 EMERGENCY DEPT VISIT LOW MDM: CPT | Mod: 25

## 2020-08-07 NOTE — ED PROVIDER NOTES
Please note that my documentation in this Electronic Healthcare Record was produced using speech recognition software and therefore may contain errors related to that software.These could include grammar, punctuation and spelling errors or the inclusion/ exclusion of phrases that were not intended. Please contact myself for any clarification, questions or concerns.    HPI: Patient is a 34 y.o. male who presents with the chief complaint of palpitations.  Patient feels as if his heart has been skipping a beat for the last 5 days.  Has had similar symptoms but never had it evaluated.  States it happens every few minutes.  Denies any associated symptoms such as lightheadedness, dizziness, chest pain, shortness of breath, visual changes, extremity weakness or paresthesias.  He does drink 2 monster energy drinks every morning.  He did stop taking them a couple days ago but still having the palpitations.  He is now getting headaches from the caffeine withdrawals.  Previous history of diabetes but controlled with diet.  He does not take any medication regularly.  Does use tobacco but denies any drugs or alcohol.  Denies any allergies.    REVIEW OF SYSTEMS - 10 systems were independently reviewed and are otherwise negative with the exception of those items previously documented in the HPI and nursing notes.    Allergy: Patient has no known allergies.    Past medical history:   Past Medical History:   Diagnosis Date    Diabetes mellitus, type 2        Surgical History:   Past Surgical History:   Procedure Laterality Date    TONSILLECTOMY         Social history:   Social History     Socioeconomic History    Marital status:      Spouse name: Not on file    Number of children: Not on file    Years of education: Not on file    Highest education level: Not on file   Occupational History    Occupation:      Employer: [employer]   Social Needs    Financial resource strain: Not on file    Food  "insecurity     Worry: Not on file     Inability: Not on file    Transportation needs     Medical: Not on file     Non-medical: Not on file   Tobacco Use    Smoking status: Former Smoker     Years: 10.00    Smokeless tobacco: Current User     Types: Snuff   Substance and Sexual Activity    Alcohol use: No    Drug use: No    Sexual activity: Yes     Partners: Female   Lifestyle    Physical activity     Days per week: Not on file     Minutes per session: Not on file    Stress: Not on file   Relationships    Social connections     Talks on phone: Not on file     Gets together: Not on file     Attends Bahai service: Not on file     Active member of club or organization: Not on file     Attends meetings of clubs or organizations: Not on file     Relationship status: Not on file   Other Topics Concern    Not on file   Social History Narrative    Not on file       Family history: non-contributory    EHR: reviewed    Vitals: BP (!) 134/93   Pulse 78   Temp 98 °F (36.7 °C)   Resp 20   Ht 5' 10" (1.778 m)   Wt 111.6 kg (246 lb)   SpO2 99%   BMI 35.30 kg/m²     PHYSICAL EXAM:    General-34-year-old male awake and alert, oriented, GCS 15, in no acute distress,  HEENT- normocephalic, atraumatic, sclera anicteric, moist mucous membranes, PERRL, EOMI  CARDIOVASCULAR- regular rate and rhythm, no murmurs/rubs,/gallops, normal S1-S2  PULMONARY- nonlabored, no respiratory distress, clear to auscultation bilaterally, no wheezes/rhonchi/rales, chest expansion symmetrical  NEUROLOGIC- mental status normal, speech fluid, cognition normal, CN II-XII grossly intact, sensations equal normal bilateral upper and lower extremities, peripheral pulse 2 +/4, ambulatory with proper gait.  MUSCULOSKELETAL- well-nourished, well-developed  DERMATOLOGIC- warm and dry, no visible rashes  PSYCHIATRIC- normal affect, normal concentration           Labs Reviewed   POCT GLUCOSE       No orders to display    EKG 08/07/2020 10:53 a.m..  " Sinus rhythm 60.  Intervals normal.  Normal axis deviation.  No ST segment changes or T-wave inversions noted.  No obvious signs of arrhythmia, Blas-Parkinson-White, or MI    MEDICAL DECISION MAKING: Patient is a 34 y.o. male who presented with chief complaint of palpitations.  Not currently experiencing the symptom at this time.  Does not have any associated symptoms with it.  Does feel as if his heart is skipping a beat every so often.  No current medical problems.  Does not take any medication regularly.  He does use tobacco and also drinks 2 energy drinks per day.  He is trying to cut back and is now experiencing withdrawal headache.  Currently has no symptoms at this time.  On examination, cardiac and pulmonary exam was normal.  No extremity edema.  Denies any family history of ACS.  Came in because his wife was concerned and wanted him to be checked out.  EKG performed today is normal.  Advised patient that he could have multiple arrhythmias but we need to have a Holter monitor and see a primary care.  Low suspicion for SVT, WPW, MI, thyroid storm at this time.    CLINICAL IMPRESSION:  1. Palpitations         JEFF Rebolledo  08/07/20 [id number]

## 2020-08-07 NOTE — DISCHARGE INSTRUCTIONS
Keep a written log of the time and day when he developed the symptoms.  Please abstain from drinking any caffeinated products.  Return for any worsening or new symptoms. Follow up with Primary Care Provider in the next 2-3 days.

## 2020-08-10 ENCOUNTER — OFFICE VISIT (OUTPATIENT)
Dept: FAMILY MEDICINE | Facility: CLINIC | Age: 35
End: 2020-08-10
Payer: COMMERCIAL

## 2020-08-10 ENCOUNTER — HOSPITAL ENCOUNTER (OUTPATIENT)
Dept: CARDIOLOGY | Facility: HOSPITAL | Age: 35
Discharge: HOME | End: 2020-08-10
Attending: NURSE PRACTITIONER
Payer: COMMERCIAL

## 2020-08-10 VITALS
WEIGHT: 250 LBS | SYSTOLIC BLOOD PRESSURE: 132 MMHG | BODY MASS INDEX: 35.79 KG/M2 | DIASTOLIC BLOOD PRESSURE: 87 MMHG | TEMPERATURE: 97 F | HEART RATE: 62 BPM | OXYGEN SATURATION: 97 % | RESPIRATION RATE: 18 BRPM | HEIGHT: 70 IN

## 2020-08-10 DIAGNOSIS — Z13.220 ENCOUNTER FOR LIPID SCREENING FOR CARDIOVASCULAR DISEASE: ICD-10-CM

## 2020-08-10 DIAGNOSIS — R00.2 HEART PALPITATIONS: ICD-10-CM

## 2020-08-10 DIAGNOSIS — Z13.6 ENCOUNTER FOR LIPID SCREENING FOR CARDIOVASCULAR DISEASE: ICD-10-CM

## 2020-08-10 DIAGNOSIS — R00.2 HEART PALPITATIONS: Primary | ICD-10-CM

## 2020-08-10 DIAGNOSIS — Z13.1 SCREENING FOR DIABETES MELLITUS (DM): ICD-10-CM

## 2020-08-10 PROCEDURE — 99999 PR PBB SHADOW E&M-EST. PATIENT-LVL III: CPT | Mod: PBBFAC,,, | Performed by: NURSE PRACTITIONER

## 2020-08-10 PROCEDURE — 99999 PR PBB SHADOW E&M-EST. PATIENT-LVL III: ICD-10-PCS | Mod: PBBFAC,,, | Performed by: NURSE PRACTITIONER

## 2020-08-10 PROCEDURE — 99213 OFFICE O/P EST LOW 20 MIN: CPT | Mod: S$GLB,,, | Performed by: NURSE PRACTITIONER

## 2020-08-10 PROCEDURE — 0298T HOLTER MONITOR - 3-14 DAY ADULT (CUPID ONLY): ICD-10-PCS | Mod: ,,, | Performed by: INTERNAL MEDICINE

## 2020-08-10 PROCEDURE — 3008F PR BODY MASS INDEX (BMI) DOCUMENTED: ICD-10-PCS | Mod: S$GLB,,, | Performed by: NURSE PRACTITIONER

## 2020-08-10 PROCEDURE — 3008F BODY MASS INDEX DOCD: CPT | Mod: S$GLB,,, | Performed by: NURSE PRACTITIONER

## 2020-08-10 PROCEDURE — 0298T HOLTER MONITOR - 3-14 DAY ADULT (CUPID ONLY): CPT | Mod: ,,, | Performed by: INTERNAL MEDICINE

## 2020-08-10 PROCEDURE — 99213 PR OFFICE/OUTPT VISIT, EST, LEVL III, 20-29 MIN: ICD-10-PCS | Mod: S$GLB,,, | Performed by: NURSE PRACTITIONER

## 2020-08-10 NOTE — PROGRESS NOTES
"Subjective:       Patient ID: Maye Islas is a 34 y.o. male.    Chief Complaint: Irregular Heart Beat    Mr. Maye Islas is a 34 year old male who presents to the clinic today for evaluation of heart palpitations. Reports he went to the ER last week for eval. Reports they are becoming worse. Previously was drinking monsters, but has not in quite a few days. No medical hx of heart concerns. No family of heart concerns. Denies cp sob n/v/d. Reports he can feel when it happens and it is worrying him. Denies hx of HTN or PAC's. Currently not taking any medication OTC. Active.     Review of Systems   Constitutional: Negative for activity change, diaphoresis, fatigue and fever.   Respiratory: Negative for cough, chest tightness, shortness of breath and wheezing.    Cardiovascular: Positive for palpitations. Negative for chest pain and leg swelling.   Gastrointestinal: Negative for abdominal pain, diarrhea, nausea and vomiting.   Genitourinary: Negative for difficulty urinating.   Musculoskeletal: Negative for arthralgias and myalgias.   Neurological: Negative for dizziness, tremors, syncope and weakness.   Psychiatric/Behavioral: Negative for confusion and dysphoric mood.         Reviewed family, medical, surgical, and social history.    Objective:      /87 (BP Location: Left arm, Patient Position: Sitting, BP Method: Medium (Automatic))   Pulse 62   Temp 97.3 °F (36.3 °C) (Oral)   Resp 18   Ht 5' 10" (1.778 m)   Wt 113.4 kg (250 lb)   SpO2 97%   BMI 35.87 kg/m²   Physical Exam  Vitals signs reviewed.   Constitutional:       Appearance: Normal appearance.   HENT:      Head: Normocephalic.      Right Ear: Tympanic membrane normal.      Left Ear: Tympanic membrane normal.      Nose: Nose normal.   Cardiovascular:      Rate and Rhythm: Normal rate.      Pulses: Normal pulses.      Heart sounds: No murmur.      Comments: PAC's noted upon exam when having patient lie down on exam   Pulmonary:      Effort: " Pulmonary effort is normal.      Breath sounds: Normal breath sounds. No wheezing.   Abdominal:      General: Bowel sounds are normal. There is no distension.      Tenderness: There is no abdominal tenderness.   Skin:     General: Skin is warm.      Capillary Refill: Capillary refill takes less than 2 seconds.   Neurological:      General: No focal deficit present.      Mental Status: He is alert.   Psychiatric:         Mood and Affect: Mood normal.         Behavior: Behavior normal.         Thought Content: Thought content normal.         Judgment: Judgment normal.         Assessment:       1. Heart palpitations    2. Encounter for lipid screening for cardiovascular disease    3. Screening for diabetes mellitus (DM)        Plan:       Heart palpitations  -     CBC auto differential; Future; Expected date: 08/17/2020  -     Comprehensive metabolic panel; Future; Expected date: 08/10/2020  -     TSH; Future; Expected date: 08/10/2020  -     Holter Monitor - 3-14 Day Adult; Future    Encounter for lipid screening for cardiovascular disease  -     Lipid Panel; Future; Expected date: 08/10/2020    Screening for diabetes mellitus (DM)  -     Hemoglobin A1C; Future; Expected date: 08/10/2020        PLAN:  - Discussed with patient the plan of care   - Obtain labs and Holter monitor   - Medications reviewed. Medication side effects discussed. Patient has no questions or concerns at this time. Informed patient to notify me regarding any concerns.   - Continue monitoring , keep diary, avoid caffeine   - Informed patient to please notify me with any questions or concerns at anytime  - Follow up ordered for 2-4 weeks        Risks, benefits, and side effects were discussed with the patient. All questions were answered to the fullest satisfaction of the patient, and pt verbalized understanding and agreement to treatment plan. Pt was to call with any new or worsening symptoms, or present to the ER.

## 2020-08-17 ENCOUNTER — PATIENT OUTREACH (OUTPATIENT)
Dept: ADMINISTRATIVE | Facility: HOSPITAL | Age: 35
End: 2020-08-17

## 2020-08-17 NOTE — PROGRESS NOTES
Pre-visit Chart review notification  Spoke with pt about overdue screenings. Pt stated that his last eye exam was with Dr Perla. Encouraged pt to sign up with [facility] at upcoming appt.  08/17/20  FAX SENT TO DR PERLA FOR MOST RECENT DM EYE EXAM RESULTS

## 2020-08-19 ENCOUNTER — PATIENT OUTREACH (OUTPATIENT)
Dept: ADMINISTRATIVE | Facility: HOSPITAL | Age: 35
End: 2020-08-19

## 2020-09-09 ENCOUNTER — PATIENT OUTREACH (OUTPATIENT)
Dept: ADMINISTRATIVE | Facility: OTHER | Age: 35
End: 2020-09-09

## 2022-10-20 NOTE — PROGRESS NOTES
Health Maintenance Due   Topic Date Due    Hepatitis C Screening  1985    Foot Exam  11/19/1994    HIV Screening  11/19/1999    Influenza Vaccine (1) 08/01/2020     Updates were requested from care everywhere.  Chart was reviewed for overdue Proactive [facility] Encounters ([facility]) topics (CRS, Breast Cancer Screening, Eye exam)  Health Maintenance has been updated.  [facility] immunization registry triggered.  Immunizations were reconciled.       Mammogram outreach: LVM for patient to return call regarding mammogram.